# Patient Record
Sex: MALE | Race: BLACK OR AFRICAN AMERICAN | NOT HISPANIC OR LATINO
[De-identification: names, ages, dates, MRNs, and addresses within clinical notes are randomized per-mention and may not be internally consistent; named-entity substitution may affect disease eponyms.]

---

## 2019-05-21 ENCOUNTER — APPOINTMENT (OUTPATIENT)
Dept: PULMONOLOGY | Facility: CLINIC | Age: 69
End: 2019-05-21
Payer: MEDICARE

## 2019-05-21 VITALS
SYSTOLIC BLOOD PRESSURE: 122 MMHG | OXYGEN SATURATION: 93 % | DIASTOLIC BLOOD PRESSURE: 72 MMHG | BODY MASS INDEX: 39.1 KG/M2 | HEIGHT: 73 IN | HEART RATE: 77 BPM | WEIGHT: 295 LBS

## 2019-05-21 VITALS — OXYGEN SATURATION: 95 %

## 2019-05-21 DIAGNOSIS — Z87.39 PERSONAL HISTORY OF OTHER DISEASES OF THE MUSCULOSKELETAL SYSTEM AND CONNECTIVE TISSUE: ICD-10-CM

## 2019-05-21 DIAGNOSIS — Z86.39 PERSONAL HISTORY OF OTHER ENDOCRINE, NUTRITIONAL AND METABOLIC DISEASE: ICD-10-CM

## 2019-05-21 DIAGNOSIS — Z86.79 PERSONAL HISTORY OF OTHER DISEASES OF THE CIRCULATORY SYSTEM: ICD-10-CM

## 2019-05-21 DIAGNOSIS — Z82.49 FAMILY HISTORY OF ISCHEMIC HEART DISEASE AND OTHER DISEASES OF THE CIRCULATORY SYSTEM: ICD-10-CM

## 2019-05-21 PROCEDURE — 85018 HEMOGLOBIN: CPT | Mod: QW

## 2019-05-21 PROCEDURE — 94664 DEMO&/EVAL PT USE INHALER: CPT | Mod: 59

## 2019-05-21 PROCEDURE — 99215 OFFICE O/P EST HI 40 MIN: CPT | Mod: 25

## 2019-05-21 PROCEDURE — 94727 GAS DIL/WSHOT DETER LNG VOL: CPT

## 2019-05-21 PROCEDURE — 99205 OFFICE O/P NEW HI 60 MIN: CPT | Mod: 25

## 2019-05-21 PROCEDURE — 94060 EVALUATION OF WHEEZING: CPT

## 2019-05-21 PROCEDURE — 94729 DIFFUSING CAPACITY: CPT

## 2019-05-21 RX ORDER — TADALAFIL 20 MG/1
20 TABLET, FILM COATED ORAL
Refills: 0 | Status: ACTIVE | COMMUNITY

## 2019-05-21 RX ORDER — ROSUVASTATIN CALCIUM 20 MG/1
20 TABLET, FILM COATED ORAL
Refills: 0 | Status: ACTIVE | COMMUNITY

## 2019-05-21 RX ORDER — RAMIPRIL 5 MG/1
5 CAPSULE ORAL
Refills: 0 | Status: ACTIVE | COMMUNITY

## 2019-05-21 RX ORDER — ERGOCALCIFEROL 1.25 MG/1
1.25 MG CAPSULE ORAL
Refills: 0 | Status: ACTIVE | COMMUNITY

## 2019-05-21 RX ORDER — ASPIRIN 81 MG
81 TABLET, DELAYED RELEASE (ENTERIC COATED) ORAL
Refills: 0 | Status: ACTIVE | COMMUNITY

## 2019-05-21 RX ORDER — COLCHICINE 0.6 MG/1
0.6 TABLET ORAL
Refills: 0 | Status: ACTIVE | COMMUNITY

## 2019-05-21 RX ORDER — ALBUTEROL SULFATE 90 UG/1
108 (90 BASE) AEROSOL, METERED RESPIRATORY (INHALATION)
Refills: 0 | Status: ACTIVE | COMMUNITY

## 2019-05-21 RX ORDER — SITAGLIPTIN AND METFORMIN HYDROCHLORIDE 50; 1000 MG/1; MG/1
50-1000 TABLET, FILM COATED ORAL
Refills: 0 | Status: ACTIVE | COMMUNITY

## 2019-05-21 RX ORDER — FLUTICASONE PROPIONATE 50 UG/1
50 SPRAY, METERED NASAL
Refills: 0 | Status: ACTIVE | COMMUNITY

## 2019-05-21 NOTE — REVIEW OF SYSTEMS
[Recent Wt Gain (___ Lbs)] : recent [unfilled] ~Ulb weight gain [As Noted in HPI] : as noted in HPI [Negative] : Dermatologic

## 2019-05-21 NOTE — PROCEDURE
[FreeTextEntry1] : CT chest 5/19: stable elevated R hemidiaphragm, and basilar atelectasis. no change from 5/18\par elevated R fermin stable from 2013\par \par PFTs : severe restrictive impairment, moderate decrease in DLCO which corrects

## 2019-05-21 NOTE — CONSULT LETTER
[Dear  ___] : Dear  [unfilled], [Consult Letter:] : I had the pleasure of evaluating your patient, [unfilled]. [Please see my note below.] : Please see my note below. [Sincerely,] : Sincerely, [FreeTextEntry3] : [facility]\par Pul[facility] Critical Care\par Director Pulmonary Division\par Medical Director Respiratory Therapy\par [facility]\par \par  [DrAnjum  ___] : Dr. BECKER

## 2019-05-21 NOTE — PHYSICAL EXAM
[General Appearance - Well Developed] : well developed [Normal Appearance] : normal appearance [General Appearance - Well Nourished] : well nourished [Normal Conjunctiva] : the conjunctiva exhibited no abnormalities [Normal Oropharynx] : normal oropharynx [III] : III [Neck Appearance] : the appearance of the neck was normal [Heart Rate And Rhythm] : heart rate and rhythm were normal [Heart Sounds] : normal S1 and S2 [Edema] : no peripheral edema present [Respiration, Rhythm And Depth] : normal respiratory rhythm and effort [Exaggerated Use Of Accessory Muscles For Inspiration] : no accessory muscle use [Auscultation Breath Sounds / Voice Sounds] : lungs were clear to auscultation bilaterally [Lungs Percussion] : the lungs were normal to percussion [Abnormal Walk] : normal gait [Nail Clubbing] : no clubbing of the fingernails [Cyanosis, Localized] : no localized cyanosis [] : no rash [No Focal Deficits] : no focal deficits [Oriented To Time, Place, And Person] : oriented to person, place, and time [Impaired Insight] : insight and judgment were intact [Affect] : the affect was normal [Memory Recent] : recent memory was not impaired [FreeTextEntry1] : no chest wall abn

## 2019-05-21 NOTE — HISTORY OF PRESENT ILLNESS
[FreeTextEntry1] : Had URI treated with abx\par now better\par former smoker 20 yrs , quit 30 years ago\par has moderate LUPE ( AHI 28) , cpap 15- intermittently compliant, followed [facility]\par follows with Dr Dickson  Cardiology\par no fever, chill, chest pain\par no sig sputum\par works as

## 2019-05-21 NOTE — DISCUSSION/SUMMARY
[FreeTextEntry1] : Severe restrictive physiologic impairment \par CT scan stable without suspicious abnormalities\par Underlying obesity and right hemidiaphragm dysfunction ( chronic)\par Currently close to baseline, oxygenation improved, no bronchospasm on exam\par Cardiology follow up closely by Dr Dickson\par Discussed compliance with CPAP 15  for moderate obstructive sleep apnea ( AHI 27)\par Which contributes to his fatigue and daytime hypersomnolence\par Nasal pillow mask sample given to patient and will followup with [facility] sleep laboratory\par D-dimer sent for completeness, but suspicion is not high\par Weight loss would be paramount and the importance stressed with patient and wife\par We'll follow up in 6 months or sooner if needed

## 2019-05-24 ENCOUNTER — MEDICATION RENEWAL (OUTPATIENT)
Age: 69
End: 2019-05-24

## 2019-05-24 ENCOUNTER — MESSAGE (OUTPATIENT)
Age: 69
End: 2019-05-24

## 2019-06-17 ENCOUNTER — MESSAGE (OUTPATIENT)
Age: 69
End: 2019-06-17

## 2019-07-24 ENCOUNTER — APPOINTMENT (OUTPATIENT)
Dept: PULMONOLOGY | Facility: CLINIC | Age: 69
End: 2019-07-24
Payer: MEDICARE

## 2019-07-24 VITALS — SYSTOLIC BLOOD PRESSURE: 118 MMHG | HEART RATE: 100 BPM | DIASTOLIC BLOOD PRESSURE: 60 MMHG | OXYGEN SATURATION: 92 %

## 2019-07-24 VITALS — OXYGEN SATURATION: 94 %

## 2019-07-24 VITALS — BODY MASS INDEX: 39.32 KG/M2 | WEIGHT: 298 LBS

## 2019-07-24 PROCEDURE — 94010 BREATHING CAPACITY TEST: CPT

## 2019-07-24 PROCEDURE — 99214 OFFICE O/P EST MOD 30 MIN: CPT | Mod: 25

## 2019-07-24 RX ORDER — DOXYCYCLINE HYCLATE 100 MG/1
100 CAPSULE ORAL
Refills: 0 | Status: DISCONTINUED | COMMUNITY
End: 2019-07-24

## 2019-07-24 NOTE — PROCEDURE
[FreeTextEntry1] : CT chest 5/19: stable elevated R hemidiaphragm, and basilar atelectasis. no change from 5/18\par elevated R fermin stable from 2013\par spirometry with moderate restrictive impairment:improved FVC and FEV1 from 5/19

## 2019-07-24 NOTE — REASON FOR VISIT
[Follow-Up] : a follow-up visit [Shortness of Breath] : shortness of Breath [Sleep Apnea] : sleep apnea [FreeTextEntry2] : LUPE

## 2019-07-24 NOTE — PHYSICAL EXAM
[General Appearance - Well Developed] : well developed [Normal Appearance] : normal appearance [Normal Conjunctiva] : the conjunctiva exhibited no abnormalities [General Appearance - Well Nourished] : well nourished [Normal Oropharynx] : normal oropharynx [III] : III [Neck Appearance] : the appearance of the neck was normal [Edema] : no peripheral edema present [Heart Sounds] : normal S1 and S2 [Heart Rate And Rhythm] : heart rate and rhythm were normal [Exaggerated Use Of Accessory Muscles For Inspiration] : no accessory muscle use [Respiration, Rhythm And Depth] : normal respiratory rhythm and effort [Lungs Percussion] : the lungs were normal to percussion [Auscultation Breath Sounds / Voice Sounds] : lungs were clear to auscultation bilaterally [Abnormal Walk] : normal gait [Cyanosis, Localized] : no localized cyanosis [Nail Clubbing] : no clubbing of the fingernails [Oriented To Time, Place, And Person] : oriented to person, place, and time [No Focal Deficits] : no focal deficits [Impaired Insight] : insight and judgment were intact [Affect] : the affect was normal [Memory Recent] : recent memory was not impaired [] : no rash [FreeTextEntry1] : no chest wall abn

## 2019-07-24 NOTE — CONSULT LETTER
[Consult Letter:] : I had the pleasure of evaluating your patient, [unfilled]. [Dear  ___] : Dear  [unfilled], [Please see my note below.] : Please see my note below. [Sincerely,] : Sincerely, [DrAnjum  ___] : Dr. BECKER [FreeTextEntry3] : [facility]\par Pulmonary Critical Care\par Director Pulmonary Division\par Medical Director Respiratory Therapy\par [facility]\par \par

## 2019-07-24 NOTE — HISTORY OF PRESENT ILLNESS
[FreeTextEntry1] : using Advair intermittently\par now better most days\par former smoker 20 yrs , quit 30 years ago\par has moderate LUPE ( AHI 28) , cpap 15- intermittently compliant, followed [facility]\par follows with Dr Dickson  Cardiology\par no fever, chill, chest pain\par no sig sputum\par works as

## 2019-07-24 NOTE — DISCUSSION/SUMMARY
[FreeTextEntry1] : Improving restrictive physiologic impairment , ? reversible restriction asthmatic component\par Continue Advair for now\par CT scan stable without suspicious abnormalities\par V/Q scan low prob, duplex was negative\par Underlying obesity and right hemidiaphragm dysfunction ( chronic)\par Currently close to baseline, oxygenation improved, no bronchospasm on exam\par Cardiology follow up closely by Dr Dickson\par Discussed compliance  CPAP 15  for moderate obstructive sleep apnea ( AHI 27), following with [facility]\par Weight loss would be paramount and the importance stressed with patient \par We'll follow up in 6 months or sooner if needed

## 2019-11-21 ENCOUNTER — APPOINTMENT (OUTPATIENT)
Dept: PULMONOLOGY | Facility: CLINIC | Age: 69
End: 2019-11-21

## 2020-01-24 ENCOUNTER — APPOINTMENT (OUTPATIENT)
Dept: PULMONOLOGY | Facility: CLINIC | Age: 70
End: 2020-01-24
Payer: MEDICARE

## 2020-01-24 VITALS
WEIGHT: 287 LBS | HEIGHT: 74 IN | BODY MASS INDEX: 36.83 KG/M2 | SYSTOLIC BLOOD PRESSURE: 115 MMHG | DIASTOLIC BLOOD PRESSURE: 70 MMHG

## 2020-01-24 VITALS — OXYGEN SATURATION: 94 %

## 2020-01-24 VITALS — HEART RATE: 92 BPM | OXYGEN SATURATION: 93 %

## 2020-01-24 PROCEDURE — 99214 OFFICE O/P EST MOD 30 MIN: CPT

## 2020-01-24 RX ORDER — CLOTRIMAZOLE AND BETAMETHASONE DIPROPIONATE 10; .5 MG/G; MG/G
1-0.05 CREAM TOPICAL
Refills: 0 | Status: DISCONTINUED | COMMUNITY
End: 2020-01-24

## 2020-01-24 NOTE — DISCUSSION/SUMMARY
[FreeTextEntry1] : Improving restrictive physiologic impairment , ? reversible restriction asthmatic component\par Advair prn\par CT 5/19 scan stable without suspicious abnormalities, quit smoking 20 ysr ago\par V/Q scan low prob, duplex was negative\par Underlying obesity and right hemidiaphragm dysfunction ( chronic)\par Currently close to baseline, oxygenation improved, no bronchospasm on exam\par Cardiology follow up closely by Dr Dickson\par Discussed compliance  CPAP 15  for moderate obstructive sleep apnea ( AHI 27), following with [facility]\par Weight loss would be paramount and the importance stressed with patient , continue dieting\par We'll follow up in 6 months or sooner if needed

## 2020-01-24 NOTE — CONSULT LETTER
[Dear  ___] : Dear  [unfilled], [Consult Letter:] : I had the pleasure of evaluating your patient, [unfilled]. [Please see my note below.] : Please see my note below. [Sincerely,] : Sincerely, [DrAnjum  ___] : Dr. BECKER [FreeTextEntry3] : [facility] DO [facility]\par Pulmonary Critical Care\par Director Pulmonary Division\par Medical Director Respiratory Therapy\par [facility]\par \par

## 2020-01-24 NOTE — PHYSICAL EXAM
[General Appearance - Well Developed] : well developed [Normal Appearance] : normal appearance [General Appearance - Well Nourished] : well nourished [Normal Conjunctiva] : the conjunctiva exhibited no abnormalities [Normal Oropharynx] : normal oropharynx [III] : III [Neck Appearance] : the appearance of the neck was normal [Heart Rate And Rhythm] : heart rate and rhythm were normal [Edema] : no peripheral edema present [Heart Sounds] : normal S1 and S2 [Respiration, Rhythm And Depth] : normal respiratory rhythm and effort [Exaggerated Use Of Accessory Muscles For Inspiration] : no accessory muscle use [Auscultation Breath Sounds / Voice Sounds] : lungs were clear to auscultation bilaterally [Lungs Percussion] : the lungs were normal to percussion [Cyanosis, Localized] : no localized cyanosis [Nail Clubbing] : no clubbing of the fingernails [Abnormal Walk] : normal gait [No Focal Deficits] : no focal deficits [Oriented To Time, Place, And Person] : oriented to person, place, and time [Impaired Insight] : insight and judgment were intact [Affect] : the affect was normal [Memory Recent] : recent memory was not impaired [] : no rash [FreeTextEntry1] : no chest wall abn

## 2020-03-16 ENCOUNTER — APPOINTMENT (OUTPATIENT)
Dept: PULMONOLOGY | Facility: CLINIC | Age: 70
End: 2020-03-16
Payer: COMMERCIAL

## 2020-03-16 VITALS — HEART RATE: 80 BPM | RESPIRATION RATE: 12 BRPM | OXYGEN SATURATION: 94 %

## 2020-03-16 VITALS — BODY MASS INDEX: 37.1 KG/M2 | WEIGHT: 286 LBS | HEIGHT: 73.5 IN

## 2020-03-16 PROCEDURE — 94010 BREATHING CAPACITY TEST: CPT

## 2020-03-16 PROCEDURE — 99214 OFFICE O/P EST MOD 30 MIN: CPT | Mod: 25

## 2020-03-16 NOTE — PHYSICAL EXAM
[General Appearance - Well Developed] : well developed [Normal Appearance] : normal appearance [General Appearance - Well Nourished] : well nourished [Normal Conjunctiva] : the conjunctiva exhibited no abnormalities [Normal Oropharynx] : normal oropharynx [III] : III [Neck Appearance] : the appearance of the neck was normal [Heart Rate And Rhythm] : heart rate and rhythm were normal [Heart Sounds] : normal S1 and S2 [Edema] : no peripheral edema present [Respiration, Rhythm And Depth] : normal respiratory rhythm and effort [Exaggerated Use Of Accessory Muscles For Inspiration] : no accessory muscle use [Auscultation Breath Sounds / Voice Sounds] : lungs were clear to auscultation bilaterally [Lungs Percussion] : the lungs were normal to percussion [Abnormal Walk] : normal gait [Nail Clubbing] : no clubbing of the fingernails [Cyanosis, Localized] : no localized cyanosis [No Focal Deficits] : no focal deficits [Oriented To Time, Place, And Person] : oriented to person, place, and time [Impaired Insight] : insight and judgment were intact [Affect] : the affect was normal [Memory Recent] : recent memory was not impaired [] : no rash [FreeTextEntry1] : no chest wall abn

## 2020-03-16 NOTE — DISCUSSION/SUMMARY
[FreeTextEntry1] : Restrictive impairment, Obesity, elevated R hemidiaphragm\par CT 5/19 scan stable without suspicious abnormalities, quit smoking 20 ysr ago\par V/Q scan low prob, duplex was negative, preoperative CXR reviewed, NAD\par Underlying obesity and right hemidiaphragm dysfunction ( chronic)\par Currently at  baseline, spirometry stable restriction, no bronchospasm on exam\par Cardiology follow up closely by Dr Dickson\par Discussed compliance  CPAP 15  for moderate obstructive sleep apnea ( AHI 27), following with [facility]\par Weight loss would be paramount and the importance stressed with patient , continue dieting\par No absolute pulmonary complications to  proposed procedure\par At increased risk of post operative pulmonary complications, need to weigh risk /benefit\par compliance with cpap stressed ( 15 cm H20) post operative and q HS\par incentive spirometry and DVT prophylaxis\par albuterol neb q 6 hrs prn\par Cardiology following and has cleared per pt

## 2020-03-16 NOTE — HISTORY OF PRESENT ILLNESS
[TextBox_4] : using Advair intermittently former smoker 20 yrs , quit 30 years ago has moderate LUPE ( AHI 28) , cpap 15- intermittently compliant, followed [facility] follows with Dr Dickson  Cardiology no fever, chill, chest pain no sig sputum works as  at baseline, no acute pulmonary complaints for elective hernia repair

## 2020-03-16 NOTE — CONSULT LETTER
[Dear  ___] : Dear  [unfilled], [Consult Letter:] : I had the pleasure of evaluating your patient, [unfilled]. [Please see my note below.] : Please see my note below. [Sincerely,] : Sincerely, [DrAnjum  ___] : Dr. BECKER [FreeTextEntry3] : [facility] DO [facility]\par Pulmonary Critical Care\par Director Pulmonary Division\par Medical Director Respiratory Therapy\par [employer]\par \par

## 2020-05-18 ENCOUNTER — RX RENEWAL (OUTPATIENT)
Age: 70
End: 2020-05-18

## 2020-08-25 ENCOUNTER — APPOINTMENT (OUTPATIENT)
Dept: PULMONOLOGY | Facility: CLINIC | Age: 70
End: 2020-08-25
Payer: MEDICARE

## 2020-08-25 VITALS
HEIGHT: 73 IN | OXYGEN SATURATION: 95 % | BODY MASS INDEX: 35.78 KG/M2 | HEART RATE: 78 BPM | SYSTOLIC BLOOD PRESSURE: 126 MMHG | DIASTOLIC BLOOD PRESSURE: 70 MMHG | WEIGHT: 270 LBS

## 2020-08-25 PROCEDURE — 99215 OFFICE O/P EST HI 40 MIN: CPT

## 2020-08-25 RX ORDER — FEBUXOSTAT 40 MG/1
40 TABLET ORAL
Refills: 0 | Status: DISCONTINUED | COMMUNITY
End: 2020-08-25

## 2020-08-25 RX ORDER — ETANERCEPT 50 MG/ML
50 SOLUTION SUBCUTANEOUS
Refills: 0 | Status: DISCONTINUED | COMMUNITY
End: 2020-08-25

## 2020-08-25 NOTE — HISTORY OF PRESENT ILLNESS
[TextBox_4] : using Advair intermittently\par  former smoker 20 yrs , quit 30 years ago has moderate LUPE ( AHI 28) , \par cpap 15- intermittently compliant, \par followed [facility] follows with Dr Dickson  Cardiology \par no fever, chill, chest pain\par  no sig sputum  \par works as \par  at baseline, \par had Covid in March, minimal symptoms

## 2020-08-25 NOTE — DISCUSSION/SUMMARY
[FreeTextEntry1] : Restrictive impairment, Obesity, elevated R hemidiaphragm\par post Covid early April, now at baseline\par dieting weight loss noted\par CT 5/19 scan stable without suspicious abnormalities, quit smoking 20 ysr ago\par V/Q scan low prob, duplex was negative, preoperative CXR reviewed, NAD\par Underlying obesity and right hemidiaphragm dysfunction ( chronic)\par Currently at  baseline, last spirometry stable restriction, no bronchospasm on exam\par Cardiology follow up closely by Dr Dickson\par Discussed compliance  CPAP 15  for moderate obstructive sleep apnea ( AHI 27), following with [facility]\par will repeat PFts at follow up, CXR\par 6 months or sooner if needed\par

## 2020-08-25 NOTE — CONSULT LETTER
[Dear  ___] : Dear  [unfilled], [Consult Letter:] : I had the pleasure of evaluating your patient, [unfilled]. [Please see my note below.] : Please see my note below. [Sincerely,] : Sincerely, [DrAnjum  ___] : Dr. BECKER [FreeTextEntry3] : [facility]\par Pulmonary Critical Care\par Director Pulmonary Division\par Medical Director Respiratory Therapy\par [facility]\par \par

## 2020-08-25 NOTE — PROCEDURE
[FreeTextEntry1] : CT chest 5/19: stable elevated R hemidiaphragm, and basilar atelectasis. no change from 5/18\par elevated R fermin stable from 2013\par spirometry with moderate restrictive impairment:improved FVC and FEV1 from 5/19\par \par CXR 3/20: elevated R hemidiaphragm, no acute change\par spirometry mod restriction, no sig change from 4/19

## 2020-09-05 ENCOUNTER — APPOINTMENT (OUTPATIENT)
Dept: DISASTER EMERGENCY | Facility: CLINIC | Age: 70
End: 2020-09-05

## 2020-09-08 ENCOUNTER — APPOINTMENT (OUTPATIENT)
Dept: PULMONOLOGY | Facility: CLINIC | Age: 70
End: 2020-09-08
Payer: MEDICARE

## 2020-09-08 VITALS — WEIGHT: 272 LBS | HEIGHT: 73.5 IN | BODY MASS INDEX: 35.28 KG/M2

## 2020-09-08 PROCEDURE — 94727 GAS DIL/WSHOT DETER LNG VOL: CPT

## 2020-09-08 PROCEDURE — 94010 BREATHING CAPACITY TEST: CPT

## 2020-09-08 PROCEDURE — 85018 HEMOGLOBIN: CPT | Mod: QW

## 2020-09-08 PROCEDURE — 94729 DIFFUSING CAPACITY: CPT

## 2020-09-09 LAB — SARS-COV-2 N GENE NPH QL NAA+PROBE: NOT DETECTED

## 2020-11-16 ENCOUNTER — RX RENEWAL (OUTPATIENT)
Age: 70
End: 2020-11-16

## 2020-11-16 RX ORDER — FLUTICASONE PROPIONATE AND SALMETEROL 50; 250 UG/1; UG/1
250-50 POWDER RESPIRATORY (INHALATION)
Qty: 1 | Refills: 4 | Status: ACTIVE | COMMUNITY
Start: 2019-05-24 | End: 1900-01-01

## 2021-01-21 ENCOUNTER — APPOINTMENT (OUTPATIENT)
Dept: PULMONOLOGY | Facility: CLINIC | Age: 71
End: 2021-01-21
Payer: MEDICARE

## 2021-01-21 VITALS — WEIGHT: 267 LBS | SYSTOLIC BLOOD PRESSURE: 120 MMHG | BODY MASS INDEX: 34.75 KG/M2 | DIASTOLIC BLOOD PRESSURE: 60 MMHG

## 2021-01-21 VITALS — OXYGEN SATURATION: 95 % | HEART RATE: 83 BPM | RESPIRATION RATE: 16 BRPM

## 2021-01-21 DIAGNOSIS — Z87.891 PERSONAL HISTORY OF NICOTINE DEPENDENCE: ICD-10-CM

## 2021-01-21 DIAGNOSIS — Z23 ENCOUNTER FOR IMMUNIZATION: ICD-10-CM

## 2021-01-21 PROCEDURE — 99213 OFFICE O/P EST LOW 20 MIN: CPT

## 2021-01-21 NOTE — DISCUSSION/SUMMARY
[FreeTextEntry1] : Restrictive impairment, Obesity, elevated R hemidiaphragm\par post Covid early April, now at baseline\par dieting weight loss noted\par CT 5/19 scan stable without suspicious abnormalities, quit smoking 20 ysr ago\par V/Q scan low prob, duplex was negative, preoperative CXR reviewed, NAD\par Underlying obesity and right hemidiaphragm dysfunction ( chronic)\par Currently at  baseline, PFts with normal TLC and DLCO, no bronchospasm on exam\par Cardiology follow up closely by Dr Dickson\par Discussed compliance  CPAP 15  for moderate obstructive sleep apnea ( AHI 27), following with [facility]\par Will repeat CXR\par 6 months or sooner if needed\par

## 2021-01-21 NOTE — PHYSICAL EXAM
[Normal Appearance] : normal appearance [General Appearance - Well Developed] : well developed [General Appearance - Well Nourished] : well nourished [Normal Conjunctiva] : the conjunctiva exhibited no abnormalities [Normal Oropharynx] : normal oropharynx [III] : III [Neck Appearance] : the appearance of the neck was normal [Heart Rate And Rhythm] : heart rate and rhythm were normal [Heart Sounds] : normal S1 and S2 [Edema] : no peripheral edema present [Respiration, Rhythm And Depth] : normal respiratory rhythm and effort [Exaggerated Use Of Accessory Muscles For Inspiration] : no accessory muscle use [Auscultation Breath Sounds / Voice Sounds] : lungs were clear to auscultation bilaterally [Lungs Percussion] : the lungs were normal to percussion [Abnormal Walk] : normal gait [Cyanosis, Localized] : no localized cyanosis [Nail Clubbing] : no clubbing of the fingernails [No Focal Deficits] : no focal deficits [Oriented To Time, Place, And Person] : oriented to person, place, and time [Impaired Insight] : insight and judgment were intact [Affect] : the affect was normal [Memory Recent] : recent memory was not impaired [] : no rash [FreeTextEntry1] : no chest wall abn

## 2021-01-21 NOTE — CONSULT LETTER
[Dear  ___] : Dear  [unfilled], [Consult Letter:] : I had the pleasure of evaluating your patient, [unfilled]. [Please see my note below.] : Please see my note below. [Sincerely,] : Sincerely, [DrAnjum  ___] : Dr. BECKER [FreeTextEntry3] : [facility] DO [facility]\par Pulmonary Critical Care\par Director Pulmonary Division\par Medical Director Respiratory Therapy\par [facility]\par \par

## 2021-01-21 NOTE — PROCEDURE
[FreeTextEntry1] : CT chest 5/19: stable elevated R hemidiaphragm, and basilar atelectasis. no change from 5/18\par elevated R fermin stable from 2013\par spirometry with moderate restrictive impairment:improved FVC and FEV1 from 5/19\par \par CXR 3/20: elevated R hemidiaphragm, no acute change\par 9/20: PFTs no air flow obstruction, normal TLC and DLCO

## 2021-01-21 NOTE — HISTORY OF PRESENT ILLNESS
[TextBox_4] : not using Advair \par former smoker 20 yrs , quit 30 years ago  has moderate LUPE ( AHI 28) , \par cpap 15- intermittently compliant, \par followed [facility] follows with Dr Dickson  Cardiology \par no fever, chill, chest pain\par  no sig sputum  \par works as \par  working on diet, overall improved\par had Covid in March, minimal symptoms

## 2021-07-29 ENCOUNTER — APPOINTMENT (OUTPATIENT)
Dept: PULMONOLOGY | Facility: CLINIC | Age: 71
End: 2021-07-29

## 2021-09-07 ENCOUNTER — APPOINTMENT (OUTPATIENT)
Dept: PULMONOLOGY | Facility: CLINIC | Age: 71
End: 2021-09-07
Payer: MEDICARE

## 2021-09-07 VITALS — OXYGEN SATURATION: 95 %

## 2021-09-07 VITALS — SYSTOLIC BLOOD PRESSURE: 124 MMHG | BODY MASS INDEX: 35.27 KG/M2 | WEIGHT: 271 LBS | DIASTOLIC BLOOD PRESSURE: 74 MMHG

## 2021-09-07 VITALS — OXYGEN SATURATION: 93 % | HEART RATE: 69 BPM

## 2021-09-07 PROCEDURE — 99213 OFFICE O/P EST LOW 20 MIN: CPT

## 2021-09-07 RX ORDER — MONTELUKAST 10 MG/1
10 TABLET, FILM COATED ORAL
Qty: 30 | Refills: 0 | Status: ACTIVE | COMMUNITY
Start: 2021-08-23

## 2021-09-07 RX ORDER — USTEKINUMAB 45 MG/.5ML
45 INJECTION, SOLUTION SUBCUTANEOUS
Refills: 0 | Status: DISCONTINUED | COMMUNITY
Start: 2020-08-25 | End: 2021-09-07

## 2021-09-07 RX ORDER — ETANERCEPT 50 MG/ML
50 SOLUTION SUBCUTANEOUS
Qty: 24 | Refills: 0 | Status: ACTIVE | COMMUNITY
Start: 2021-08-06

## 2022-04-07 ENCOUNTER — APPOINTMENT (OUTPATIENT)
Dept: PULMONOLOGY | Facility: CLINIC | Age: 72
End: 2022-04-07
Payer: MEDICARE

## 2022-04-07 VITALS — BODY MASS INDEX: 36.31 KG/M2 | SYSTOLIC BLOOD PRESSURE: 118 MMHG | WEIGHT: 279 LBS | DIASTOLIC BLOOD PRESSURE: 72 MMHG

## 2022-04-07 VITALS — OXYGEN SATURATION: 94 % | RESPIRATION RATE: 16 BRPM | HEART RATE: 71 BPM

## 2022-04-07 PROCEDURE — 99213 OFFICE O/P EST LOW 20 MIN: CPT

## 2022-04-07 NOTE — DISCUSSION/SUMMARY
[FreeTextEntry1] : Restrictive impairment, Obesity, elevated R hemidiaphragm\par LUPE on Cpap followed at [facility] Sleep lab- compliance stressed\par CT 5/19 scan stable without suspicious abnormalities, quit smoking 20 ysr ago\par V/Q scan low prob, duplex was negative,\par Underlying obesity and right hemidiaphragm dysfunction ( chronic),CXR 3/22 no change\par Currently at  baseline, PFts with normal TLC and DLCO, no bronchospasm on exam\par Cardiology follow up closely by Dr Dickson\par Weight loss encouraged\par Had cobid vax x 3, getting 4th\par 6 months or sooner if needed\par

## 2022-04-07 NOTE — PHYSICAL EXAM
[General Appearance - Well Developed] : well developed [Normal Appearance] : normal appearance [General Appearance - Well Nourished] : well nourished [Normal Conjunctiva] : the conjunctiva exhibited no abnormalities [Normal Oropharynx] : normal oropharynx [III] : III [Neck Appearance] : the appearance of the neck was normal [Heart Rate And Rhythm] : heart rate and rhythm were normal [Heart Sounds] : normal S1 and S2 [Edema] : no peripheral edema present [Respiration, Rhythm And Depth] : normal respiratory rhythm and effort [Exaggerated Use Of Accessory Muscles For Inspiration] : no accessory muscle use [Auscultation Breath Sounds / Voice Sounds] : lungs were clear to auscultation bilaterally [Lungs Percussion] : the lungs were normal to percussion [FreeTextEntry1] : no chest wall abn [Abnormal Walk] : normal gait [Nail Clubbing] : no clubbing of the fingernails [Cyanosis, Localized] : no localized cyanosis [No Focal Deficits] : no focal deficits [Oriented To Time, Place, And Person] : oriented to person, place, and time [Impaired Insight] : insight and judgment were intact [Affect] : the affect was normal [Memory Recent] : recent memory was not impaired [] : no rash

## 2022-04-07 NOTE — CONSULT LETTER
[Dear  ___] : Dear  [unfilled], [Consult Letter:] : I had the pleasure of evaluating your patient, [unfilled]. [Please see my note below.] : Please see my note below. [Sincerely,] : Sincerely, [DrAnjum  ___] : Dr. BECKER [FreeTextEntry3] : [facility]\par Pulmonary Critical Care\par Director Pulmonary Division\par Medical Director Respiratory Therapy\par [facility]\par \par

## 2022-04-07 NOTE — PROCEDURE
[FreeTextEntry1] : CT chest 5/19: stable elevated R hemidiaphragm, and basilar atelectasis. no change from 5/18\par elevated R fermin stable from 2013\par spirometry with moderate restrictive impairment:improved FVC and FEV1 from 5/19\par \par CXR 3/22: elevated R hemidiaphragm, no acute change\par 9/20: PFTs no air flow obstruction, normal TLC and DLCO

## 2022-04-07 NOTE — HISTORY OF PRESENT ILLNESS
[TextBox_4] : not using Advair \par former smoker 20 yrs , quit 30 years ago  has moderate LUPE ( AHI 28) , \par cpap 15- intermittently compliant, \par followed [facility] follows with Dr Dickson  Cardiology \par no fever, chill, chest pain\par  no sig sputum  \par works as \par  \par no acute pulmonary complaints

## 2022-04-07 NOTE — DISCUSSION/SUMMARY
[FreeTextEntry1] : Restrictive impairment, Obesity, elevated R hemidiaphragm\par LUPE on Cpap followed at [facility] Sleep lab- compliance stressed\par CT 5/19 scan stable without suspicious abnormalities, quit smoking 20 ysr ago\par V/Q scan low prob, duplex was negative,\par Underlying obesity and right hemidiaphragm dysfunction ( chronic),CXR 3/22 no change\par Currently at  baseline, PFts with normal TLC and DLCO, no bronchospasm on exam\par Cardiology follow up closely by Dr Dickson\par Weight loss encouraged\par Had cobid vax x 3, getting 4th\par 6 months or sooner if needed\par

## 2022-06-12 ENCOUNTER — NON-APPOINTMENT (OUTPATIENT)
Age: 72
End: 2022-06-12

## 2022-06-27 ENCOUNTER — NON-APPOINTMENT (OUTPATIENT)
Age: 72
End: 2022-06-27

## 2022-10-20 ENCOUNTER — APPOINTMENT (OUTPATIENT)
Dept: PULMONOLOGY | Facility: CLINIC | Age: 72
End: 2022-10-20

## 2023-04-22 ENCOUNTER — NON-APPOINTMENT (OUTPATIENT)
Age: 73
End: 2023-04-22

## 2023-06-01 ENCOUNTER — APPOINTMENT (OUTPATIENT)
Dept: PULMONOLOGY | Facility: CLINIC | Age: 73
End: 2023-06-01
Payer: MEDICARE

## 2023-06-01 VITALS
SYSTOLIC BLOOD PRESSURE: 132 MMHG | DIASTOLIC BLOOD PRESSURE: 72 MMHG | BODY MASS INDEX: 36.05 KG/M2 | WEIGHT: 277 LBS | OXYGEN SATURATION: 91 % | HEART RATE: 80 BPM

## 2023-06-01 VITALS — OXYGEN SATURATION: 93 %

## 2023-06-01 DIAGNOSIS — Z01.811 ENCOUNTER FOR PREPROCEDURAL RESPIRATORY EXAMINATION: ICD-10-CM

## 2023-06-01 PROCEDURE — 99214 OFFICE O/P EST MOD 30 MIN: CPT

## 2023-06-01 RX ORDER — ALFUZOSIN HCL 10 MG/1
10 TABLET, EXTENDED RELEASE ORAL
Refills: 0 | Status: DISCONTINUED | COMMUNITY
End: 2023-06-01

## 2023-06-01 RX ORDER — FEBUXOSTAT 40 MG/1
40 TABLET ORAL
Refills: 0 | Status: ACTIVE | COMMUNITY

## 2023-06-01 NOTE — HISTORY OF PRESENT ILLNESS
[Former] : former [TextBox_4] : not using Advair \par former smoker 20 yrs , quit 30 years ago  has moderate LUPE ( AHI 28) , \par [facility] sleep, no longer using machine, was on cpap 15\par no fever, chill, chest pain\par  no sig sputum  \par works as \par  no acute pulmonary complaints\par For hemorrhoidectomy [facility] [TextBox_11] : 1 [TextBox_13] : 20 [YearQuit] : 1990

## 2023-06-01 NOTE — PROCEDURE
[FreeTextEntry1] : CT chest 5/19: stable elevated R hemidiaphragm, and basilar atelectasis. no change from 5/18\par elevated R fermin stable from 2013\par spirometry with moderate restrictive impairment:improved FVC and FEV1 from 5/19\par \par CXR 3/22: elevated R hemidiaphragm, no acute change\par 9/20: PFTs no air flow obstruction, normal TLC and DLCO\par \par CXR 3/23 NAD, no change from [place] 3/22

## 2023-06-01 NOTE — DISCUSSION/SUMMARY
[FreeTextEntry1] : Restrictive impairment, Obesity, elevated R hemidiaphragm\par Moderate LUPE ( AHI 28) on Cpap 15- no longer uses\par CT 5/19 scan stable without suspicious abnormalities, quit smoking 30 ysr ago\par V/Q scan low prob, duplex was negative,\par Underlying obesity and right hemidiaphragm dysfunction ( chronic),CXR 3/23 no change\par Currently at  baseline, last  PFts with no obstruction on spirometry, normal TLC and DLCO, no bronchospasm on exam, borderline sp02- baseline\par Cardiology follow up  by Dr Dickson\par Discussed risks of untreated LUPE, he will consider in future\par Weight loss discussed, he will discuss with endocrinology\par 6 months or sooner if needed with repeat spirometry\par No absolute pulmonary contraindications to surgical; procedure\par At increased risk of post operative pulmonary complications\par autopap  6-20 post anesthesia and prn, ( cpap 15- if no autopap based on hx)\par Incentive spirometry, DVT prophylaxis, monitored bed, continuous sp02\par Avoid narcotics as analgesia\par

## 2023-06-01 NOTE — CONSULT LETTER
[Dear  ___] : Dear  [unfilled], [Consult Letter:] : I had the pleasure of evaluating your patient, [unfilled]. [Please see my note below.] : Please see my note below. [Sincerely,] : Sincerely, [DrAnjum  ___] : Dr. BECKER [FreeTextEntry3] : [facility]\par Pulmonary Critical Care\par Director Pulmonary Division\par Medical Director Respiratory Therapy\par [facility]\par \par

## 2023-12-07 ENCOUNTER — APPOINTMENT (OUTPATIENT)
Dept: PULMONOLOGY | Facility: CLINIC | Age: 73
End: 2023-12-07
Payer: COMMERCIAL

## 2023-12-07 VITALS
OXYGEN SATURATION: 91 % | SYSTOLIC BLOOD PRESSURE: 132 MMHG | DIASTOLIC BLOOD PRESSURE: 78 MMHG | HEART RATE: 83 BPM | BODY MASS INDEX: 36.06 KG/M2 | WEIGHT: 278 LBS | RESPIRATION RATE: 16 BRPM | HEIGHT: 73.5 IN

## 2023-12-07 VITALS — OXYGEN SATURATION: 93 %

## 2023-12-07 DIAGNOSIS — R05.9 COUGH, UNSPECIFIED: ICD-10-CM

## 2023-12-07 DIAGNOSIS — E66.01 MORBID (SEVERE) OBESITY DUE TO EXCESS CALORIES: ICD-10-CM

## 2023-12-07 PROCEDURE — 99213 OFFICE O/P EST LOW 20 MIN: CPT

## 2024-02-12 ENCOUNTER — APPOINTMENT (OUTPATIENT)
Dept: NUCLEAR MEDICINE | Facility: CLINIC | Age: 74
End: 2024-02-12

## 2024-03-14 ENCOUNTER — APPOINTMENT (OUTPATIENT)
Dept: PULMONOLOGY | Facility: CLINIC | Age: 74
End: 2024-03-14
Payer: MEDICARE

## 2024-03-14 VITALS — BODY MASS INDEX: 35.54 KG/M2 | HEIGHT: 73.5 IN | WEIGHT: 274 LBS

## 2024-03-14 VITALS
RESPIRATION RATE: 16 BRPM | SYSTOLIC BLOOD PRESSURE: 124 MMHG | DIASTOLIC BLOOD PRESSURE: 80 MMHG | OXYGEN SATURATION: 92 % | HEART RATE: 73 BPM

## 2024-03-14 VITALS — OXYGEN SATURATION: 94 %

## 2024-03-14 DIAGNOSIS — J98.4 OTHER DISORDERS OF LUNG: ICD-10-CM

## 2024-03-14 PROCEDURE — 99214 OFFICE O/P EST MOD 30 MIN: CPT | Mod: 25

## 2024-03-14 PROCEDURE — 94010 BREATHING CAPACITY TEST: CPT

## 2024-03-14 NOTE — PROCEDURE
[FreeTextEntry1] :  spirometry with worsening restriction CT 9/23, new cyst L lung base , elevated R fermin - chronic emphysema

## 2024-03-14 NOTE — REVIEW OF SYSTEMS
[Recent Wt Gain (___ Lbs)] : recent [unfilled] ~Ulb weight gain [As Noted in HPI] : as noted in HPI [Negative] : HEENT

## 2024-03-14 NOTE — HISTORY OF PRESENT ILLNESS
[Former] : former [TextBox_13] : 20 [TextBox_11] : 1 [TextBox_4] : Doing well offer up no acute pulmonary complaints No CP or SOB Recent Cardiac w/u negative per pt Working on wt loss Saw Dr Santos for oral; appliance needs repeat sleep study [YearQuit] : 1990

## 2024-03-14 NOTE — CONSULT LETTER
[Dear  ___] : Dear  [unfilled], [Please see my note below.] : Please see my note below. [Consult Letter:] : I had the pleasure of evaluating your patient, [unfilled]. [Sincerely,] : Sincerely, [DrAnjum  ___] : Dr. BECKER [FreeTextEntry3] : Gregor Currie DO [facility]\par  Pulmonary Critical Care\par  Director Pulmonary Division\par  Medical Director Respiratory Therapy\par  [facility] [place]\par  \par

## 2024-03-14 NOTE — DISCUSSION/SUMMARY
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

## 2024-03-14 NOTE — PHYSICAL EXAM
[General Appearance - Well Developed] : well developed [Normal Appearance] : normal appearance [Normal Conjunctiva] : the conjunctiva exhibited no abnormalities [General Appearance - Well Nourished] : well nourished [Normal Oropharynx] : normal oropharynx [III] : III [Neck Appearance] : the appearance of the neck was normal [Heart Rate And Rhythm] : heart rate and rhythm were normal [Heart Sounds] : normal S1 and S2 [Edema] : no peripheral edema present [Exaggerated Use Of Accessory Muscles For Inspiration] : no accessory muscle use [Respiration, Rhythm And Depth] : normal respiratory rhythm and effort [Lungs Percussion] : the lungs were normal to percussion [Auscultation Breath Sounds / Voice Sounds] : lungs were clear to auscultation bilaterally [Abnormal Walk] : normal gait [Cyanosis, Localized] : no localized cyanosis [Nail Clubbing] : no clubbing of the fingernails [No Focal Deficits] : no focal deficits [Impaired Insight] : insight and judgment were intact [Oriented To Time, Place, And Person] : oriented to person, place, and time [] : no rash [Memory Recent] : recent memory was not impaired [Affect] : the affect was normal [FreeTextEntry1] : no chest wall abn

## 2024-03-14 NOTE — REASON FOR VISIT
[Shortness of Breath] : shortness of Breath [Follow-Up] : a follow-up visit [Sleep Apnea] : sleep apnea [FreeTextEntry2] : LUPE

## 2024-04-02 ENCOUNTER — OUTPATIENT (OUTPATIENT)
Dept: OUTPATIENT SERVICES | Facility: HOSPITAL | Age: 74
LOS: 1 days | End: 2024-04-02
Payer: MEDICARE

## 2024-04-02 DIAGNOSIS — G47.33 OBSTRUCTIVE SLEEP APNEA (ADULT) (PEDIATRIC): ICD-10-CM

## 2024-04-02 PROCEDURE — G0400: CPT | Mod: 26

## 2024-04-02 PROCEDURE — 95800 SLP STDY UNATTENDED: CPT

## 2024-04-09 ENCOUNTER — NON-APPOINTMENT (OUTPATIENT)
Age: 74
End: 2024-04-09

## 2024-04-10 ENCOUNTER — APPOINTMENT (OUTPATIENT)
Dept: PULMONOLOGY | Facility: CLINIC | Age: 74
End: 2024-04-10
Payer: MEDICARE

## 2024-04-10 VITALS
RESPIRATION RATE: 16 BRPM | HEIGHT: 73.5 IN | BODY MASS INDEX: 35.54 KG/M2 | DIASTOLIC BLOOD PRESSURE: 82 MMHG | HEART RATE: 79 BPM | OXYGEN SATURATION: 92 % | SYSTOLIC BLOOD PRESSURE: 118 MMHG | WEIGHT: 274 LBS

## 2024-04-10 PROCEDURE — G2211 COMPLEX E/M VISIT ADD ON: CPT

## 2024-04-10 PROCEDURE — 99215 OFFICE O/P EST HI 40 MIN: CPT

## 2024-04-10 NOTE — DISCUSSION/SUMMARY
[FreeTextEntry1] : 74-year-old male seen today for reevaluation of his sleep apnea.  Recent home study consistent with severe obstructive sleep apnea with severe desaturations.  In light of the patient's prior CPAP requirements he most likely would not be a candidate for an oral appliance.  I have therefore provided the patient and properly sized and fitted him for an F&P Nadja fullface mask and reinitiating CPAP.  His current unit is greater than 10 years old.  If he is unable to tolerate this then the only other considerations would be an oral appliance if his effective pressures are now less than or equal to 10 and if not inspire.

## 2024-04-10 NOTE — HISTORY OF PRESENT ILLNESS
[Home] : home [Obstructive Sleep Apnea] : obstructive sleep apnea [Awakes Unrefreshed] : awakes unrefreshed [Awakes with Dry Mouth] : awakes with dry mouth [Awakes with Headache] : awakes with headache [Snoring] : snoring [Witnessed Apneas] : witnessed apneas [TextBox_77] : 2100 [TextBox_79] : 2665 [TextBox_81] : 5 [TextBox_89] : 1 [TextBox_100] : 4/2/2024 [TextBox_108] : 39.7 [TextBox_112] : 128min [TextBox_116] : 55 [TextBox_120] : Prior CPAP 15 [ESS] : 8

## 2024-05-09 ENCOUNTER — OUTPATIENT (OUTPATIENT)
Dept: OUTPATIENT SERVICES | Facility: HOSPITAL | Age: 74
LOS: 1 days | End: 2024-05-09

## 2024-05-09 ENCOUNTER — APPOINTMENT (OUTPATIENT)
Dept: NUCLEAR MEDICINE | Facility: CLINIC | Age: 74
End: 2024-05-09
Payer: MEDICARE

## 2024-05-09 DIAGNOSIS — R25.1 TREMOR, UNSPECIFIED: ICD-10-CM

## 2024-05-09 PROCEDURE — 78803 RP LOCLZJ TUM SPECT 1 AREA: CPT | Mod: 26,MH

## 2024-06-26 ENCOUNTER — APPOINTMENT (OUTPATIENT)
Dept: PULMONOLOGY | Facility: CLINIC | Age: 74
End: 2024-06-26
Payer: COMMERCIAL

## 2024-06-26 VITALS
HEART RATE: 91 BPM | SYSTOLIC BLOOD PRESSURE: 122 MMHG | BODY MASS INDEX: 36.06 KG/M2 | HEIGHT: 73.5 IN | OXYGEN SATURATION: 92 % | WEIGHT: 278 LBS | DIASTOLIC BLOOD PRESSURE: 66 MMHG | RESPIRATION RATE: 16 BRPM

## 2024-06-26 DIAGNOSIS — Z71.89 OTHER SPECIFIED COUNSELING: ICD-10-CM

## 2024-06-26 PROCEDURE — 99204 OFFICE O/P NEW MOD 45 MIN: CPT

## 2024-06-26 PROCEDURE — 99214 OFFICE O/P EST MOD 30 MIN: CPT

## 2024-06-27 ENCOUNTER — APPOINTMENT (OUTPATIENT)
Dept: PULMONOLOGY | Facility: CLINIC | Age: 74
End: 2024-06-27
Payer: COMMERCIAL

## 2024-06-27 VITALS — RESPIRATION RATE: 16 BRPM | DIASTOLIC BLOOD PRESSURE: 74 MMHG | SYSTOLIC BLOOD PRESSURE: 120 MMHG

## 2024-06-27 VITALS — HEIGHT: 73.5 IN | BODY MASS INDEX: 35.54 KG/M2 | WEIGHT: 274 LBS

## 2024-06-27 VITALS — OXYGEN SATURATION: 91 % | HEART RATE: 71 BPM

## 2024-06-27 VITALS — OXYGEN SATURATION: 94 %

## 2024-06-27 DIAGNOSIS — J43.9 EMPHYSEMA, UNSPECIFIED: ICD-10-CM

## 2024-06-27 DIAGNOSIS — R94.2 ABNORMAL RESULTS OF PULMONARY FUNCTION STUDIES: ICD-10-CM

## 2024-06-27 DIAGNOSIS — R06.09 OTHER FORMS OF DYSPNEA: ICD-10-CM

## 2024-06-27 DIAGNOSIS — J98.6 DISORDERS OF DIAPHRAGM: ICD-10-CM

## 2024-06-27 DIAGNOSIS — G47.33 OBSTRUCTIVE SLEEP APNEA (ADULT) (PEDIATRIC): ICD-10-CM

## 2024-06-27 PROCEDURE — 94010 BREATHING CAPACITY TEST: CPT

## 2024-06-27 PROCEDURE — 99213 OFFICE O/P EST LOW 20 MIN: CPT | Mod: 25

## 2024-08-29 ENCOUNTER — APPOINTMENT (OUTPATIENT)
Dept: PULMONOLOGY | Facility: CLINIC | Age: 74
End: 2024-08-29
Payer: COMMERCIAL

## 2024-08-29 VITALS — WEIGHT: 275 LBS | HEIGHT: 73.5 IN | BODY MASS INDEX: 35.67 KG/M2

## 2024-08-29 VITALS
RESPIRATION RATE: 16 BRPM | DIASTOLIC BLOOD PRESSURE: 70 MMHG | HEART RATE: 73 BPM | SYSTOLIC BLOOD PRESSURE: 112 MMHG | OXYGEN SATURATION: 93 %

## 2024-08-29 DIAGNOSIS — Z71.89 OTHER SPECIFIED COUNSELING: ICD-10-CM

## 2024-08-29 DIAGNOSIS — G47.33 OBSTRUCTIVE SLEEP APNEA (ADULT) (PEDIATRIC): ICD-10-CM

## 2024-08-29 PROCEDURE — G2211 COMPLEX E/M VISIT ADD ON: CPT

## 2024-08-29 PROCEDURE — 99214 OFFICE O/P EST MOD 30 MIN: CPT

## 2024-08-29 RX ORDER — ORAL SEMAGLUTIDE 14 MG/1
TABLET ORAL
Refills: 0 | Status: ACTIVE | COMMUNITY

## 2024-08-29 RX ORDER — EMPAGLIFLOZIN 25 MG/1
TABLET, FILM COATED ORAL
Refills: 0 | Status: ACTIVE | COMMUNITY

## 2024-08-29 NOTE — HISTORY OF PRESENT ILLNESS
[Obstructive Sleep Apnea] : obstructive sleep apnea [Awakes Unrefreshed] : awakes unrefreshed [Awakes with Dry Mouth] : awakes with dry mouth [Home] : home [APAP:] : APAP [Awakes with Headache] : does not awaken with headache [Snoring] : no snoring [Witnessed Apneas] : no witnessed apneas [TextBox_77] : 2100 [TextBox_79] : 4184 [TextBox_81] : 5 [TextBox_89] : 1 [TextBox_100] : 4/2/2024 [TextBox_108] : 39.7 [TextBox_112] : 128min [TextBox_116] : 55 [TextBox_120] : Prior CPAP 15 [TextBox_125] : 8-18 [TextBox_127] : 7/30/2024 [TextBox_129] : 8/28/2024 [TextBox_133] : 73 [TextBox_137] : 70 [TextBox_141] : 6 [TextBox_143] : 47 [TextBox_147] : 24.6 [TextBox_162] : 6/12/2024 [TextBox_165] : P90 5% 17.77 m H2O Air leakage 95%: 82.6 L/min, patient has been using a large F20 mask supplied by me on his last visit [ESS] : 8

## 2024-08-29 NOTE — DISCUSSION/SUMMARY
[FreeTextEntry1] : 74-year-old male seen today with a history of severe obstructive sleep apnea.  Patient has had some response but continues to have significant severe air leak.  He has been fitted with a large Vitera FFM secondary to his complaints that he moves around during the course of the night causing the air leak.  If this fails the patient may be candidate for BiPAP

## 2024-08-29 NOTE — CONSULT LETTER
[Dear  ___] : Dear  [unfilled], [Consult Letter:] : I had the pleasure of evaluating your patient, [unfilled]. [Please see my note below.] : Please see my note below. [Consult Closing:] : Thank you very much for allowing me to participate in the care of this patient.  If you have any questions, please do not hesitate to contact me. [Sincerely,] : Sincerely, [FreeTextEntry3] : Rene Calvo MD FCCP Pulmonary/Critical Care/Sleep Medicine Department of Internal Medicine  [facility]

## 2024-11-05 ENCOUNTER — APPOINTMENT (OUTPATIENT)
Dept: PULMONOLOGY | Facility: CLINIC | Age: 74
End: 2024-11-05
Payer: MEDICARE

## 2024-11-05 VITALS
OXYGEN SATURATION: 94 % | HEART RATE: 91 BPM | HEIGHT: 73.5 IN | DIASTOLIC BLOOD PRESSURE: 71 MMHG | RESPIRATION RATE: 16 BRPM | BODY MASS INDEX: 36.06 KG/M2 | SYSTOLIC BLOOD PRESSURE: 129 MMHG | WEIGHT: 278 LBS

## 2024-11-05 DIAGNOSIS — Z71.89 OTHER SPECIFIED COUNSELING: ICD-10-CM

## 2024-11-05 DIAGNOSIS — G47.33 OBSTRUCTIVE SLEEP APNEA (ADULT) (PEDIATRIC): ICD-10-CM

## 2024-11-05 PROCEDURE — 99214 OFFICE O/P EST MOD 30 MIN: CPT

## 2024-11-05 PROCEDURE — G2211 COMPLEX E/M VISIT ADD ON: CPT

## 2024-12-07 ENCOUNTER — OUTPATIENT (OUTPATIENT)
Dept: OUTPATIENT SERVICES | Facility: HOSPITAL | Age: 74
LOS: 1 days | End: 2024-12-07
Payer: COMMERCIAL

## 2024-12-07 DIAGNOSIS — G47.33 OBSTRUCTIVE SLEEP APNEA (ADULT) (PEDIATRIC): ICD-10-CM

## 2024-12-07 PROCEDURE — 95811 POLYSOM 6/>YRS CPAP 4/> PARM: CPT | Mod: 26

## 2024-12-07 PROCEDURE — 95811 POLYSOM 6/>YRS CPAP 4/> PARM: CPT

## 2024-12-20 ENCOUNTER — APPOINTMENT (OUTPATIENT)
Dept: PULMONOLOGY | Facility: CLINIC | Age: 74
End: 2024-12-20

## 2025-01-16 ENCOUNTER — APPOINTMENT (OUTPATIENT)
Dept: PULMONOLOGY | Facility: CLINIC | Age: 75
End: 2025-01-16
Payer: MEDICARE

## 2025-01-16 VITALS
BODY MASS INDEX: 33.72 KG/M2 | HEART RATE: 75 BPM | HEIGHT: 73.5 IN | OXYGEN SATURATION: 95 % | RESPIRATION RATE: 16 BRPM | SYSTOLIC BLOOD PRESSURE: 120 MMHG | WEIGHT: 260 LBS | DIASTOLIC BLOOD PRESSURE: 80 MMHG

## 2025-01-16 DIAGNOSIS — G47.33 OBSTRUCTIVE SLEEP APNEA (ADULT) (PEDIATRIC): ICD-10-CM

## 2025-01-16 PROCEDURE — 99213 OFFICE O/P EST LOW 20 MIN: CPT

## 2025-01-16 PROCEDURE — G2211 COMPLEX E/M VISIT ADD ON: CPT

## 2025-03-18 ENCOUNTER — APPOINTMENT (OUTPATIENT)
Dept: PULMONOLOGY | Facility: CLINIC | Age: 75
End: 2025-03-18
Payer: COMMERCIAL

## 2025-03-18 VITALS
HEART RATE: 67 BPM | RESPIRATION RATE: 16 BRPM | DIASTOLIC BLOOD PRESSURE: 74 MMHG | SYSTOLIC BLOOD PRESSURE: 112 MMHG | BODY MASS INDEX: 33.98 KG/M2 | WEIGHT: 262 LBS | OXYGEN SATURATION: 95 % | HEIGHT: 73.5 IN

## 2025-03-18 DIAGNOSIS — Z71.89 OTHER SPECIFIED COUNSELING: ICD-10-CM

## 2025-03-18 DIAGNOSIS — G47.33 OBSTRUCTIVE SLEEP APNEA (ADULT) (PEDIATRIC): ICD-10-CM

## 2025-03-18 PROCEDURE — G2211 COMPLEX E/M VISIT ADD ON: CPT | Mod: NC

## 2025-03-18 PROCEDURE — 99213 OFFICE O/P EST LOW 20 MIN: CPT

## 2025-06-05 ENCOUNTER — APPOINTMENT (OUTPATIENT)
Dept: PULMONOLOGY | Facility: CLINIC | Age: 75
End: 2025-06-05
Payer: COMMERCIAL

## 2025-06-05 VITALS
HEART RATE: 94 BPM | SYSTOLIC BLOOD PRESSURE: 118 MMHG | HEIGHT: 73.5 IN | BODY MASS INDEX: 35.28 KG/M2 | WEIGHT: 272 LBS | RESPIRATION RATE: 16 BRPM | DIASTOLIC BLOOD PRESSURE: 84 MMHG | OXYGEN SATURATION: 94 %

## 2025-06-05 DIAGNOSIS — R94.2 ABNORMAL RESULTS OF PULMONARY FUNCTION STUDIES: ICD-10-CM

## 2025-06-05 DIAGNOSIS — Z71.89 OTHER SPECIFIED COUNSELING: ICD-10-CM

## 2025-06-05 DIAGNOSIS — G47.33 OBSTRUCTIVE SLEEP APNEA (ADULT) (PEDIATRIC): ICD-10-CM

## 2025-06-05 PROCEDURE — 99214 OFFICE O/P EST MOD 30 MIN: CPT

## 2025-06-05 PROCEDURE — G2211 COMPLEX E/M VISIT ADD ON: CPT | Mod: NC
